# Patient Record
(demographics unavailable — no encounter records)

---

## 2024-11-14 NOTE — HISTORY OF PRESENT ILLNESS
[de-identified] : 56-year-old female with history of hypertension, GERD, presents today for follow-up of chronic conditions Patient's LDL cholesterol was 137 Recommendation was conservative management  murmur on exam - no cp, sob, nausea, diaphoresis - endorses- sharp pain with certain activities

## 2024-11-14 NOTE — ASSESSMENT
[FreeTextEntry1] :  HTN - stable well controlled - continue current management - counseled on AHA guidelines for guidance regarding exercise (30-40min 3 times a week - recommend low salt diet - recommend medication compliance   MSK pain - mild tendinopathy, arthropathy for PT eval  - to continue follow up with specialist as well   murmur  - pulmonic murmur on exam - systolic  - asymptomatic  - evaluate with ekg

## 2025-06-24 NOTE — HISTORY OF PRESENT ILLNESS
[de-identified] : 57-year-old female with history of hypertension presents today for evaluation of acute concerns for hair loss and perimenopausal symptoms

## 2025-06-24 NOTE — ASSESSMENT
[FreeTextEntry1] :  HTN - stable well controlled - continue current management - counseled on AHA guidelines for guidance regarding exercise (30-40min 3 times a week - recommend low salt diet - recommend medication compliance   Patient had general concerns for weight loss She has an upcoming appointment with medical weight management Had an improvement with Contrave but stopped the medication previously The patient was never able to get a GLP-1  Hair loss Patient has ongoing hair loss for the last few months She does relate this to stress She has no systemic symptoms which are related to the hair loss Though she does endorse as noted above some mild weight gain at least 6 pounds in the last 6 months She has a history of brittle nails She does have a history of fatigue at times  Anxiety Generalized anxiety noted by patient Does not interfere with sleep but seems to interfere other aspects of her life she does wish to seek out a counselor

## 2025-06-26 NOTE — HISTORY OF PRESENT ILLNESS
[FreeTextEntry1] : 57F PMH overweight, HTN, HLD, lumbar radiculitis, knee pain, GERD, alpha-thal trait, who presents to weight management for follow-up.  She is an  who initially presented 12/2023 reporting significant weight gain over the preceding 3 years since the start of menopause. She tried to manage weight with intermittent fasting and added exercise. Sleep likely deficient, also noted for loud snoring and req 2 antihypertensives, suspected possible ANTWON.  We discussed lifestyle and dietary changes. We discussed medical treatments, GLP treatment prescribed Contrave. She re-established ~18 months after her initial visit   Weight today:168 lbs, BMI 30.3, ?BF 25.1% Weight at Initial Visit (12/7/23): 163 lbs 6 oz, BMI 28.94, BF 37%  Medication: She took Contrave for 2-3 weeks (after appt in 2023) was busy, forgot, decided to just stop. Didn't have side effects, thinks she maybe lost a few pounds and that's it.   Diet: Feels like she struggles with food/appetite, even though always active. "[place]ian diet' and struggles with sweets and rice.  B: Coffee with milk and 1 sugar L (11:30-1): Salad or sandwich, dessert D (7:30 pm): White rice, pork/chicken, soup. vegetables Snack: chips, snacks, at night, sometimes instead of dinner.  Beverages: Coffee with milk and sugar, unsweetened tea, water. Night-time eating: no Fast-food/Restaurants: none, except when traveling Food Journal: no  Exercise: yoga, jayme, pilates, stationary bike, used to do 3-4x/wk, but stopped as she's now struggling with arthritis and sciatica. Started playing golf weekly.   Sleep:  (Estimates 4-5 hours per night, falls asleep 10:30-11 pm, wakes up 4-5 am, interrupted bathroom, does snore.   Labs (4/2023), wnl.

## 2025-06-26 NOTE — ASSESSMENT
[FreeTextEntry1] : 57F PMH overweight, HTN, HLD, lumbar radiculitis, knee pain, GERD, alpha-thal trait, who presents to weight management for follow-up.  # Overweight c/b HTN, HLD, msk pain, GERD: Weight today 168 lbs, BMI 30.3, ?BF 25.1%, weight a bit above initial appointment a few years ago. Feels she's active but her issue is eating, sweet cravings, rice, etc. Given her BP is poorly controlled, I feel that GLP therapy would be best (as opposed to Qsymia, Contrave, phentermine, etc). Diet noted for some sweets and snacks in the evening, discussed building structure to diet, having meals to help eliminate snacks/sweets, focus on whole foods, more vegetables, protein sources, etc. Given her poorly controlled BP, weight, loud snoring, I have strong suspicion for ANTWON. - Food log - Meal planning/prep - Restart exercise, stationary bike as low impact for knee - Sleep specialist referral for isidro - Wegovy prescribed, may alternatively consider Zepbound particularly if she is found to have mod-severe ANTWON.  - F/u in one month

## 2025-07-03 NOTE — HEALTH RISK ASSESSMENT
[Very Good] : ~his/her~  mood as very good [No] : No [0] : 2) Feeling down, depressed, or hopeless: Not at all (0) [PHQ-2 Negative - No further assessment needed] : PHQ-2 Negative - No further assessment needed [Never] : Never [NO] : No [Patient reported mammogram was normal] : Patient reported mammogram was normal [Patient reported PAP Smear was normal] : Patient reported PAP Smear was normal [Patient reported colonoscopy was normal] : Patient reported colonoscopy was normal [HIV Test offered] : HIV Test offered [Hepatitis C test offered] : Hepatitis C test offered [With Family] : lives with family [Employed] : employed [] :  [Reviewed no changes] : Reviewed, no changes [Name: ___] : Health Care Proxy's Name: [unfilled]  [[id number]] : 0 [Change in mental status noted] : No change in mental status noted [MammogramDate] : 2023 [PapSmearDate] : 2023 [ColonoscopyDate] : 2021 [ColonoscopyComments] : 5yrs

## 2025-07-03 NOTE — PHYSICAL EXAM
[No Acute Distress] : no acute distress [Well Nourished] : well nourished [Well Developed] : well developed [Well-Appearing] : well-appearing [Normal Sclera/Conjunctiva] : normal sclera/conjunctiva [PERRL] : pupils equal round and reactive to light [EOMI] : extraocular movements intact [Normal Outer Ear/Nose] : the outer ears and nose were normal in appearance [Normal Oropharynx] : the oropharynx was normal [No JVD] : no jugular venous distention [No Lymphadenopathy] : no lymphadenopathy [Supple] : supple [Thyroid Normal, No Nodules] : the thyroid was normal and there were no nodules present [No Respiratory Distress] : no respiratory distress  [No Accessory Muscle Use] : no accessory muscle use [Clear to Auscultation] : lungs were clear to auscultation bilaterally [Normal Rate] : normal rate  [Regular Rhythm] : with a regular rhythm [Normal S1, S2] : normal S1 and S2 [No Murmur] : no murmur heard [No Carotid Bruits] : no carotid bruits [No Abdominal Bruit] : a ~M bruit was not heard ~T in the abdomen [No Varicosities] : no varicosities [Pedal Pulses Present] : the pedal pulses are present [No Edema] : there was no peripheral edema [No Palpable Aorta] : no palpable aorta [No Extremity Clubbing/Cyanosis] : no extremity clubbing/cyanosis [Soft] : abdomen soft [Non Tender] : non-tender [Non-distended] : non-distended [No Masses] : no abdominal mass palpated [No HSM] : no HSM [Normal Bowel Sounds] : normal bowel sounds [Normal Posterior Cervical Nodes] : no posterior cervical lymphadenopathy [Normal Anterior Cervical Nodes] : no anterior cervical lymphadenopathy [No CVA Tenderness] : no CVA  tenderness [No Spinal Tenderness] : no spinal tenderness [No Joint Swelling] : no joint swelling [Grossly Normal Strength/Tone] : grossly normal strength/tone [No Rash] : no rash [Coordination Grossly Intact] : coordination grossly intact [No Focal Deficits] : no focal deficits [Normal Gait] : normal gait [Deep Tendon Reflexes (DTR)] : deep tendon reflexes were 2+ and symmetric [Normal Affect] : the affect was normal [Normal Insight/Judgement] : insight and judgment were intact [de-identified] : Hair loss noted at the top of the head no scarring

## 2025-07-03 NOTE — HISTORY OF PRESENT ILLNESS
[de-identified] : 57-year-old female with a history of alpha thalassemia, hypertension, dense breast, GERD presents today for annual exam patient noting brittle nails and hair loss  In the last few months noted Patient notes in her family there is some hair loss in similar areas, limited She denies any recent stressors in the last 2 to 3 months No other symptoms of weight gain significant fatigue patient getting bp levels elevated 130/90

## 2025-07-03 NOTE — ASSESSMENT
[Vaccines Reviewed] : Immunizations reviewed today. Please see immunization details in the vaccine log within the immunization flowsheet.  [FreeTextEntry1] : patient with appropriate preventative UTD  no concerns on exam  age-appropriate counseling given.   HTN - Borderline continue to monitor follow-up - continue current management - counseled on AHA guidelines for guidance regarding exercise (30-40min 3 times a week - recommend low salt diet - recommend medication compliance   obesitya - wt loss - checking Wegovy   significant microcytosis - for eval with heme due ot sig microcytosis  - may relate to brittle nails an hair loss

## 2025-07-22 NOTE — HISTORY OF PRESENT ILLNESS
[FreeTextEntry1] : 57F PMH overweight, HTN, HLD, lumbar radiculitis, knee pain, GERD, alpha-thal trait, who presents to weight management for follow-up.  She is an  who initially presented 12/2023 reporting significant weight gain over the preceding 3 years since the start of menopause. She tried to manage weight with intermittent fasting and added exercise. Sleep likely deficient, also noted for loud snoring and req 2 antihypertensives, suspected possible ANTWON.  We discussed lifestyle and dietary changes. We discussed medical treatments, GLP treatment prescribed Contrave. She re-established ~18 months after her initial visit   Weight today: Weight at last visit (6/26/25): 168 lbs, BMI 30.3, ?BF 25.1% Weight at Initial Visit (12/7/23): 163 lbs 6 oz, BMI 28.94, BF 37%  Medication:  (She took Contrave for 2-3 weeks (after appt in 2023) was busy, forgot, decided to just stop. Didn't have side effects, thinks she maybe lost a few pounds and that's it.   Diet:  (Feels like she struggles with food/appetite, even though always active. "[place] diet' and struggles with sweets and rice.  B: Coffee with milk and 1 sugar L (11:30-1): Salad or sandwich, dessert D (7:30 pm): White rice, pork/chicken, soup. vegetables Snack: chips, snacks, at night, sometimes instead of dinner.  Beverages: Coffee with milk and sugar, unsweetened tea, water. Night-time eating: no Fast-food/Restaurants: none, except when traveling Food Journal: no  Exercise:  (yoga, jayme, pilates, stationary bike, used to do 3-4x/wk, but stopped as she's now struggling with arthritis and sciatica. Started playing golf weekly.   Sleep:  (Estimates 4-5 hours per night, falls asleep 10:30-11 pm, wakes up 4-5 am, interrupted bathroom, does snore.   Labs (4/2023), wnl.

## 2025-07-22 NOTE — PHYSICAL EXAM
[Obese, well nourished, in no acute distress] : obese, well nourished, in no acute distress [de-identified] : TEB visit

## 2025-07-23 NOTE — ASSESSMENT
[FreeTextEntry1] : Discussed diagnosis and treatment plan including PT. Ice area often limit sitting john on couch create sit to stand desk at home educated to lose weight.  Saw obesity clinic do half planks then progress to full ones.   do not take meds before playing golf only take 1 nsaid but try to cut down since has been on it a while  f/u 2 wk

## 2025-07-23 NOTE — HISTORY OF PRESENT ILLNESS
[FreeTextEntry1] : Location: back Severity: 6/10  Duration: over 1 yr Context: maybe playing a lot of golf Aggravating Factors: sitting, driving Alleviating Factors: moving Associated Symptoms: denies weight loss, fever, chills, change in bowel/bladder habits, weakness, +numbness/tingling, +radiation down right leg Prior Studies: xray

## 2025-07-23 NOTE — PROCEDURE
[de-identified] : Indication: myofascial pain   Trigger Point Tx After cleaning with alcohol, sterile needles were inserted into Ub 25 to 26, gluteus mikhail on right and manipulated intermittently followed by injection of 0.1 ml of 1% Lidocaine. The needles were then removed. Hemostasis was achieved immediately. She  tolerated the procedure well and was given discharge instructions and then discharged to home without any complaints or complications.   Exp 3/2026 Manufacture: [employer] NDC [id number] LOT [id number]

## 2025-07-23 NOTE — PHYSICAL EXAM
[FreeTextEntry1] : NATHALIE is a 57 year old female  Constitutional: healthy appearing, NAD, and overweight  LUMBAR  ROM: flexion to 30 deg, ext to 5 deg  Gait: normal  Inspection: no erythema, warmth Spine: no TTP in spinous process  Bony palpation: no TTP in GT  Soft tissue palpation hip: no TTP in gluteus mikhail Soft tissue palpation of spine: no TTP in lumbar paraspinals  5/5 bilateral KE, DF, PF sensation intact in bilat LE  Special tests: neg seated SLR

## 2025-07-25 NOTE — CONSULT LETTER
[FreeTextEntry3] : Rebecca Saleh DO, FACJIM, FACP\par Medical Oncology and Hematology\par [facility]\par

## 2025-07-25 NOTE — HISTORY OF PRESENT ILLNESS
[de-identified] : patient lost to follow up since 2021.  [de-identified] : Ms. Mcneill is a 52 year old woman who presents for initial consultation of microcytosis and elevated RDW. She reports she was first told her blood work was abnormal .  She reports feeling SOB, decrease in energy, hair brittle and nails brittle  Age of Menarche: 16 LMP: 2020, not excessively heavy, lasts 3 days or so OCP/HRT: OCP x 5yrs   Health Maintenance: Colonoscopy 2021 Dr. Singleton PAP/Mammogram  Family History: most of family remains in [facility], limited medical history Thalassemia runs in family - son and daughter Denies family history of ca  Works as

## 2025-07-25 NOTE — HISTORY OF PRESENT ILLNESS
[de-identified] : patient lost to follow up since 2021.  [de-identified] : Ms. Mcneill is a 52 year old woman who presents for initial consultation of microcytosis and elevated RDW. She reports she was first told her blood work was abnormal .  She reports feeling SOB, decrease in energy, hair brittle and nails brittle  Age of Menarche: 16 LMP: 2020, not excessively heavy, lasts 3 days or so OCP/HRT: OCP x 5yrs   Health Maintenance: Colonoscopy 2021 Dr. Singleton PAP/Mammogram  Family History: most of family remains in [facility], limited medical history Thalassemia runs in family - son and daughter Denies family history of ca  Works as

## 2025-07-25 NOTE — CONSULT LETTER
[FreeTextEntry3] : Rebecca Saleh DO, FACJIM, FACP\par Medical Oncology and Hematology\par [facility]\par

## 2025-07-25 NOTE — ASSESSMENT
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 hgb wnl. microcytosis noted  #elevated RBC  due to thalessemia trait no evidence of hemochromatosis  #fatigue, sob on exertion check  echo consider follow up with cardio  #hair brittle, nails brittle no evidence of IKER, TSH wnl. pending b12, folate, vit D she has also started wegovy which can rarely cause alopecia but it is a known side effect she also is in menopause so if this does not improve can consider GYN for HRT - no family h/o breast cancer but told if she starts she must get mammo on time and follow up with GYN  RTC in 12 months with cbc with diff, CMP, IRON, FERRITIN, B12, FOLATE, ESR/CRP, Vit D, TSH, FT4